# Patient Record
Sex: MALE | Race: ASIAN | NOT HISPANIC OR LATINO | Employment: FULL TIME | URBAN - METROPOLITAN AREA
[De-identification: names, ages, dates, MRNs, and addresses within clinical notes are randomized per-mention and may not be internally consistent; named-entity substitution may affect disease eponyms.]

---

## 2017-01-18 ENCOUNTER — OFFICE VISIT - HEALTHEAST (OUTPATIENT)
Dept: FAMILY MEDICINE | Facility: CLINIC | Age: 27
End: 2017-01-18

## 2017-01-18 DIAGNOSIS — B18.1 CHRONIC VIRAL HEPATITIS B WITHOUT DELTA AGENT AND WITHOUT COMA (H): ICD-10-CM

## 2017-01-18 ASSESSMENT — MIFFLIN-ST. JEOR: SCORE: 1593.28

## 2017-01-19 LAB — HBV DNA DETECT/QUANT, S: 205 IU/ML

## 2017-03-10 ENCOUNTER — OFFICE VISIT - HEALTHEAST (OUTPATIENT)
Dept: FAMILY MEDICINE | Facility: CLINIC | Age: 27
End: 2017-03-10

## 2017-03-10 DIAGNOSIS — M94.0 COSTOCHONDRITIS: ICD-10-CM

## 2017-03-10 ASSESSMENT — MIFFLIN-ST. JEOR: SCORE: 1609.7

## 2017-07-19 ENCOUNTER — OFFICE VISIT - HEALTHEAST (OUTPATIENT)
Dept: FAMILY MEDICINE | Facility: CLINIC | Age: 27
End: 2017-07-19

## 2017-07-19 DIAGNOSIS — B18.1 CHRONIC VIRAL HEPATITIS B WITHOUT DELTA AGENT AND WITHOUT COMA (H): ICD-10-CM

## 2017-07-19 LAB — AFP SERPL-MCNC: <2 UG/ML

## 2017-07-19 ASSESSMENT — MIFFLIN-ST. JEOR: SCORE: 1604.07

## 2017-07-24 LAB — HBV DNA DETECT/QUANT, S: 283 IU/ML

## 2018-01-18 ENCOUNTER — AMBULATORY - HEALTHEAST (OUTPATIENT)
Dept: FAMILY MEDICINE | Facility: CLINIC | Age: 28
End: 2018-01-18

## 2018-01-18 ENCOUNTER — AMBULATORY - HEALTHEAST (OUTPATIENT)
Dept: LAB | Facility: CLINIC | Age: 28
End: 2018-01-18

## 2018-01-18 DIAGNOSIS — Z11.1 SCREENING-PULMONARY TB: ICD-10-CM

## 2018-01-22 LAB
QTF INTERPRETATION: ABNORMAL
QTF MITOGEN - NIL: 8.71 IU/ML
QTF NIL: 2.53 IU/ML
QTF RESULT: POSITIVE
QTF TB ANTIGEN - NIL: 8.71 IU/ML

## 2018-01-23 ENCOUNTER — COMMUNICATION - HEALTHEAST (OUTPATIENT)
Dept: FAMILY MEDICINE | Facility: CLINIC | Age: 28
End: 2018-01-23

## 2021-01-26 ENCOUNTER — COMMUNICATION - HEALTHEAST (OUTPATIENT)
Dept: FAMILY MEDICINE | Facility: CLINIC | Age: 31
End: 2021-01-26

## 2021-01-28 ENCOUNTER — OFFICE VISIT - HEALTHEAST (OUTPATIENT)
Dept: FAMILY MEDICINE | Facility: CLINIC | Age: 31
End: 2021-01-28

## 2021-01-28 DIAGNOSIS — Z22.7 TB LUNG, LATENT: ICD-10-CM

## 2021-01-28 DIAGNOSIS — Z11.1 SCREENING EXAMINATION FOR PULMONARY TUBERCULOSIS: ICD-10-CM

## 2021-01-28 ASSESSMENT — MIFFLIN-ST. JEOR: SCORE: 1700.42

## 2021-05-30 VITALS — BODY MASS INDEX: 29.59 KG/M2 | WEIGHT: 167 LBS | HEIGHT: 63 IN

## 2021-05-30 VITALS — WEIGHT: 163.04 LBS | BODY MASS INDEX: 28.89 KG/M2 | HEIGHT: 63 IN

## 2021-05-31 VITALS — WEIGHT: 165 LBS | HEIGHT: 63 IN | BODY MASS INDEX: 29.23 KG/M2

## 2021-06-05 VITALS
OXYGEN SATURATION: 98 % | HEART RATE: 81 BPM | SYSTOLIC BLOOD PRESSURE: 118 MMHG | DIASTOLIC BLOOD PRESSURE: 70 MMHG | BODY MASS INDEX: 33.13 KG/M2 | TEMPERATURE: 98 F | RESPIRATION RATE: 16 BRPM | HEIGHT: 63 IN | WEIGHT: 187 LBS

## 2021-06-08 NOTE — PROGRESS NOTES
Assessment: /    Plan:    1. Chronic viral hepatitis B without delta agent and without coma  Hepatic Profile    Hepatitis B Virus (HBV) DNA, Detection and Quantification by RT-PCR       Recheck in 6 months.  Patient was seen with Oneyda fuenteser, Gretel Adams.      Subjective:    HPI:  Albert Adams is a 26-year-old male presenting for follow-up on hepatitis B.  Previous level was 217.        Review of Systems:  No fever, cough, vomiting, abdominal pain.      No current outpatient prescriptions on file.     No current facility-administered medications for this visit.          Objective:    Vitals:    01/18/17 0911   BP: 110/70   Pulse: 76   Resp: 18   Temp: 98.3  F (36.8  C)   SpO2: 98%       Gen:  NAD, VSS  Lungs:  normal  Heart:  normal  Abdomen:  No HSM, mass or tenderness        ADDITIONAL HISTORY SUMMARIZED (2): None.  DECISION TO OBTAIN EXTRA INFORMATION (1): None.   RADIOLOGY TESTS (1): None.  LABS (1): Ordered.  MEDICINE TESTS (1): None.  INDEPENDENT REVIEW (2 each): None.     Total Data Points: 1

## 2021-06-12 NOTE — PROGRESS NOTES
Assessment: /    Plan:    1. Chronic viral hepatitis B without delta agent and without coma  Hepatic Profile    Hepatitis B Virus (HBV) DNA, Detection and Quantification by RT-PCR ($$$)    AFP-Tumor Marker       Recheck in 6 months.  Patient was seen with Oneyda , Marianela Stephens.      Subjective:    HPI:  Albert Adams is a 26-year-old male presenting for follow-up on hepatitis B.  Viral level was 205 in January.    Social Hx: He does not use alcohol.    Review of Systems: No fever, vomiting, melena, diarrhea.      No current outpatient prescriptions on file.     No current facility-administered medications for this visit.          Objective:    Vitals:    07/19/17 0907   BP: 102/60   Pulse: 67   Resp: 19   Temp: 97.8  F (36.6  C)   SpO2: 98%       Gen:  NAD, VSS  Lungs:  normal  Heart:  normal  Abdomen:  No HSM, mass or tenderness        ADDITIONAL HISTORY SUMMARIZED (2): None.  DECISION TO OBTAIN EXTRA INFORMATION (1): None.   RADIOLOGY TESTS (1): None.  LABS (1): Ordered.  MEDICINE TESTS (1): None.  INDEPENDENT REVIEW (2 each): None.     Total Data Points: 1

## 2021-06-14 NOTE — TELEPHONE ENCOUNTER
New Appointment Needed  What is the reason for the visit:    Mantoux Placement  Appt Request  What is the purpose of the mantoux?:  Work: work  Is there a form to be completed?:   No  How soon do you need the mantoux placed?:  date: soon    Provider Preference: Any available  How soon do you need to be seen?: as soon as possible  Waitlist offered?: No  Okay to leave a detailed message:  Yes

## 2021-06-14 NOTE — PROGRESS NOTES
"ASSESMENT AND PLAN:  Diagnoses and all orders for this visit:    Screening examination for pulmonary tuberculosis  History of positive QuantiFERON gold test in 2018.  No active TB symptoms.  No indications to retest today.  Letter provided for employer, see document in chart.    TB lung, latent  Completed treatment in  per patient.      SUBJECTIVE: Albert Adams is a 30-year-old male here for screening yearly TB screening test for work.  He works as a PCA.  He had positive TB Gold QuantiFERON test in 2018.  States he was treated for latent TB in  when he first came to the [place].  No known exposure to active TB.  No chronic cough, night sweats or weight loss.    Past Medical History:   Diagnosis Date     Chronic hepatitis B (H)      LTBI (latent tuberculosis infection) 2016    treated by [facility], ending in 2016     Patient Active Problem List   Diagnosis     Chronic viral hepatitis B without delta agent and without coma (H)       Allergies:  No Known Allergies    Social History     Tobacco Use   Smoking Status Former Smoker     Types: Cigarettes     Quit date: 2013     Years since quittin.2   Smokeless Tobacco Never Used       Review of systems otherwise negative except as listed in HPI.   Social History     Tobacco Use   Smoking Status Former Smoker     Types: Cigarettes     Quit date: 2013     Years since quittin.2   Smokeless Tobacco Never Used       OBJECTICE: /70 (Patient Site: Left Arm, Patient Position: Sitting, Cuff Size: Adult Regular)   Pulse 81   Temp 98  F (36.7  C) (Oral)   Resp 16   Ht 5' 2.5\" (1.588 m)   Wt 187 lb (84.8 kg)   SpO2 98%   BMI 33.66 kg/m      DATA REVIEWED:    Labs Reviewed or Ordered (1):       GEN-alert,  in no apparent distress.  HEENT-neck is supple.  CV-regular rate and rhythm with no murmur.   RESP-lungs clear to auscultation .a.  SKIN-normal    This transcription uses voice recognition software, which may contain typographical " errors.        Jean-Claude Mcarthur   1/28/2021

## 2021-06-15 PROBLEM — B18.1 CHRONIC VIRAL HEPATITIS B WITHOUT DELTA AGENT AND WITHOUT COMA (H): Status: ACTIVE | Noted: 2017-01-18

## 2021-06-21 NOTE — LETTER
Letter by Jean-Claude Mcarthur MD at      Author: Jean-Claude Mcarthur MD Service: -- Author Type: --    Filed:  Encounter Date: 1/28/2021 Status: (Other)         January 28, 2021     Patient: Albert Adams   YOB: 1990   Date of Visit: 1/28/2021       To Whom It May Concern:    Above patient had positive QuantiFERON gold test in 2018.  He completed latent TB treatment in 2015.  He has a negative TB review of systems today.  There is no indication to repeat TB QuantiFERON gold test.  Cleared to work as a PCA    If you have any questions or concerns, please don't hesitate to call.    Sincerely,        Electronically signed by Jean-Claude Mcarthur MD

## 2022-03-04 ENCOUNTER — OFFICE VISIT (OUTPATIENT)
Dept: FAMILY MEDICINE | Facility: CLINIC | Age: 32
End: 2022-03-04
Payer: COMMERCIAL

## 2022-03-04 VITALS
SYSTOLIC BLOOD PRESSURE: 136 MMHG | OXYGEN SATURATION: 97 % | BODY MASS INDEX: 34.55 KG/M2 | TEMPERATURE: 98.1 F | HEIGHT: 63 IN | DIASTOLIC BLOOD PRESSURE: 86 MMHG | HEART RATE: 97 BPM | WEIGHT: 195 LBS

## 2022-03-04 DIAGNOSIS — Z23 NEED FOR VACCINATION: ICD-10-CM

## 2022-03-04 DIAGNOSIS — R03.0 BORDERLINE HIGH BLOOD PRESSURE: ICD-10-CM

## 2022-03-04 DIAGNOSIS — B18.1 CHRONIC VIRAL HEPATITIS B WITHOUT DELTA AGENT AND WITHOUT COMA (H): ICD-10-CM

## 2022-03-04 DIAGNOSIS — R21 RASH: Primary | ICD-10-CM

## 2022-03-04 DIAGNOSIS — Z11.4 SCREENING FOR HIV (HUMAN IMMUNODEFICIENCY VIRUS): ICD-10-CM

## 2022-03-04 DIAGNOSIS — Z11.59 NEED FOR HEPATITIS C SCREENING TEST: ICD-10-CM

## 2022-03-04 LAB
ALBUMIN SERPL-MCNC: 4.5 G/DL (ref 3.5–5)
ALP SERPL-CCNC: 76 U/L (ref 45–120)
ALT SERPL W P-5'-P-CCNC: 185 U/L (ref 0–45)
AST SERPL W P-5'-P-CCNC: 107 U/L (ref 0–40)
BILIRUB DIRECT SERPL-MCNC: 0.1 MG/DL
BILIRUB SERPL-MCNC: 0.4 MG/DL (ref 0–1)
ERYTHROCYTE [DISTWIDTH] IN BLOOD BY AUTOMATED COUNT: 13 % (ref 10–15)
HCT VFR BLD AUTO: 46.5 % (ref 40–53)
HGB BLD-MCNC: 15.9 G/DL (ref 13.3–17.7)
HIV 1+2 AB+HIV1 P24 AG SERPL QL IA: NEGATIVE
MCH RBC QN AUTO: 28.8 PG (ref 26.5–33)
MCHC RBC AUTO-ENTMCNC: 34.2 G/DL (ref 31.5–36.5)
MCV RBC AUTO: 84 FL (ref 78–100)
PLATELET # BLD AUTO: 489 10E3/UL (ref 150–450)
PROT SERPL-MCNC: 8.2 G/DL (ref 6–8)
RBC # BLD AUTO: 5.53 10E6/UL (ref 4.4–5.9)
WBC # BLD AUTO: 9.7 10E3/UL (ref 4–11)

## 2022-03-04 PROCEDURE — 99000 SPECIMEN HANDLING OFFICE-LAB: CPT | Performed by: FAMILY MEDICINE

## 2022-03-04 PROCEDURE — 80076 HEPATIC FUNCTION PANEL: CPT | Performed by: FAMILY MEDICINE

## 2022-03-04 PROCEDURE — 87517 HEPATITIS B DNA QUANT: CPT | Performed by: FAMILY MEDICINE

## 2022-03-04 PROCEDURE — 86618 LYME DISEASE ANTIBODY: CPT | Performed by: FAMILY MEDICINE

## 2022-03-04 PROCEDURE — 86780 TREPONEMA PALLIDUM: CPT | Performed by: FAMILY MEDICINE

## 2022-03-04 PROCEDURE — 86803 HEPATITIS C AB TEST: CPT | Performed by: FAMILY MEDICINE

## 2022-03-04 PROCEDURE — 87389 HIV-1 AG W/HIV-1&-2 AB AG IA: CPT | Performed by: FAMILY MEDICINE

## 2022-03-04 PROCEDURE — 87350 HEPATITIS BE AG IA: CPT | Mod: 90 | Performed by: FAMILY MEDICINE

## 2022-03-04 PROCEDURE — 36415 COLL VENOUS BLD VENIPUNCTURE: CPT | Performed by: FAMILY MEDICINE

## 2022-03-04 PROCEDURE — 99214 OFFICE O/P EST MOD 30 MIN: CPT | Performed by: FAMILY MEDICINE

## 2022-03-04 PROCEDURE — 85027 COMPLETE CBC AUTOMATED: CPT | Performed by: FAMILY MEDICINE

## 2022-03-04 RX ORDER — BENZOCAINE/MENTHOL 6 MG-10 MG
LOZENGE MUCOUS MEMBRANE 2 TIMES DAILY
Qty: 60 G | Refills: 1 | Status: SHIPPED | OUTPATIENT
Start: 2022-03-04 | End: 2022-09-05

## 2022-03-04 RX ORDER — LORATADINE 10 MG/1
10 TABLET ORAL DAILY
Qty: 30 TABLET | Refills: 3 | Status: SHIPPED | OUTPATIENT
Start: 2022-03-04 | End: 2022-09-05

## 2022-03-04 NOTE — PROGRESS NOTES
ASSESSMENT/PLAN:    Albert was seen today for derm problem.    Diagnoses and all orders for this visit:    Rash  Etiology of his rash is uncertain.  It since it started with tick bites, I did check for Lyme.  Also checking for syphilis given diffuse rash.  Will treat empirically with loratadine and hydrocortisone, but it also like him to see dermatology to more definitively diagnose and treat this rash.  -     Treponema Abs w Reflex to RPR and Titer; Future  -     Lyme Disease Sherly with reflex to WB Serum; Future  -     hydrocortisone (CORTAID) 1 % external cream; Apply topically 2 times daily  -     loratadine (CLARITIN) 10 MG tablet; Take 1 tablet (10 mg) by mouth daily  -     Adult Dermatology Referral; Future    Chronic viral hepatitis B without delta agent and without coma (H)  [facility] but has been lost to follow-up in now has not been seen for a couple years, it appears.  I got an updated labs today and will refer him to gastroenterology.  -     Hepatic function panel; Future  -     Hep B Virus DNA Quant Real Time PCR; Future  -     CBC with platelets; Future  -     INR; Future  -     Hepatitis Be antigen; Future  -     Adult Gastro Ref - Consult Only; Future    Screening for HIV (human immunodeficiency virus)  -     HIV Antigen Antibody Combo; Future    Need for hepatitis C screening test  -     Hepatitis C Screen Reflex to HCV RNA Quant and Genotype; Future    Borderline high blood pressure  Blood pressure was borderline today at 136/86.  Had last been checked here a year ago and was better 118/70.  He does note that he has gained weight since that time and wonders if that could be contributing.  He will try to initiate healthy lifestyle and will follow-up at a physical in a couple of months.    Need for vaccination  Flu shot, Covid vaccine, and Pneumovax were recommended today, but he declined.  Will reconsider at his physical.    Other orders  -     REVIEW OF HEALTH  "MAINTENANCE PROTOCOL ORDERS          Return in about 2 months (around 5/4/2022) for Routine preventive, with your regular doctor.          SUBJECTIVE:  Albert Adams is a 31 year old male here for Derm Problem (itchy)    It sounds like he got a bunch of ticks in September 2021 while he was out hunting.  Some more attached and summer on his close.  Ever since that has had trouble with a rash.  It is on his arms and legs abdomen and back.  It is itchy somewhat.  The spots are small, but they worsen if he scratches.  They also seem like they might be worse if he eats fish paste.  They got worse since when they first started.  He lives with several other family members and no one else has a rash except for him.  Has not tried anything particularly for them yet.        OBJECTIVE:  :  /86 (BP Location: Right arm, Patient Position: Sitting, Cuff Size: Adult Regular)   Pulse 97   Temp 98.1  F (36.7  C) (Oral)   Ht 1.588 m (5' 2.5\")   Wt 88.5 kg (195 lb)   SpO2 97%   BMI 35.10 kg/m    Wt Readings from Last 3 Encounters:   03/04/22 88.5 kg (195 lb)   01/28/21 84.8 kg (187 lb)   07/19/17 74.8 kg (165 lb)         Gen:  A&A, NAD  Skin: Diffuse erythematous rash with some examples below.  He has just that one large area of rashes eschar on the left shin, but otherwise variety of erythematous nonpustular papules on arms legs and abdomen, less so on the back.          Results for orders placed or performed in visit on 03/04/22   CBC with platelets   Result Value Ref Range    WBC Count 9.7 4.0 - 11.0 10e3/uL    RBC Count 5.53 4.40 - 5.90 10e6/uL    Hemoglobin 15.9 13.3 - 17.7 g/dL    Hematocrit 46.5 40.0 - 53.0 %    MCV 84 78 - 100 fL    MCH 28.8 26.5 - 33.0 pg    MCHC 34.2 31.5 - 36.5 g/dL    RDW 13.0 10.0 - 15.0 %    Platelet Count 489 (H) 150 - 450 10e3/uL      "

## 2022-03-05 LAB
HBV E AG SERPL QL IA: NEGATIVE
T PALLIDUM AB SER QL: NONREACTIVE

## 2022-03-06 LAB — HCV AB SERPL QL IA: NONREACTIVE

## 2022-03-07 LAB
B BURGDOR IGG+IGM SER QL: 0.07
HBV DNA SERPL NAA+PROBE-ACNC: <20 IU/ML
HBV DNA SERPL NAA+PROBE-LOG IU: <1.3 {LOG_IU}/ML

## 2022-03-31 ENCOUNTER — TELEPHONE (OUTPATIENT)
Dept: FAMILY MEDICINE | Facility: CLINIC | Age: 32
End: 2022-03-31
Payer: COMMERCIAL

## 2022-03-31 NOTE — TELEPHONE ENCOUNTER
I referred this patient to gastroenterology and dermatology when I saw him 3/4/22, but no appointments have been made.  Can specialty scheduling please assist him?

## 2022-05-04 ENCOUNTER — OFFICE VISIT (OUTPATIENT)
Dept: FAMILY MEDICINE | Facility: CLINIC | Age: 32
End: 2022-05-04
Payer: COMMERCIAL

## 2022-05-04 VITALS
TEMPERATURE: 98.1 F | RESPIRATION RATE: 16 BRPM | BODY MASS INDEX: 34.64 KG/M2 | HEART RATE: 91 BPM | SYSTOLIC BLOOD PRESSURE: 126 MMHG | WEIGHT: 195.5 LBS | OXYGEN SATURATION: 96 % | DIASTOLIC BLOOD PRESSURE: 80 MMHG | HEIGHT: 63 IN

## 2022-05-04 DIAGNOSIS — R21 RASH: ICD-10-CM

## 2022-05-04 DIAGNOSIS — Z00.00 ROUTINE GENERAL MEDICAL EXAMINATION AT A HEALTH CARE FACILITY: Primary | ICD-10-CM

## 2022-05-04 PROCEDURE — 99395 PREV VISIT EST AGE 18-39: CPT | Performed by: FAMILY MEDICINE

## 2022-05-04 RX ORDER — HYDROCORTISONE 2.5 %
CREAM (GRAM) TOPICAL
Qty: 30 G | Refills: 3 | Status: SHIPPED | OUTPATIENT
Start: 2022-05-04 | End: 2022-09-05

## 2022-05-04 NOTE — PROGRESS NOTES
SUBJECTIVE:   CC: Albert ASAF Adams is an 31 year old male who presents for preventative health visit.       Patient has been advised of split billing requirements and indicates understanding: Yes  Healthy Habits:     Getting at least 3 servings of Calcium per day:  Yes    Bi-annual eye exam:  NO    Dental care twice a year:  NO    Sleep apnea or symptoms of sleep apnea:  None    Diet:  Regular (no restrictions)    Frequency of exercise:  2-3 days/week    Duration of exercise:  Greater than 60 minutes    Taking medications regularly:  Yes    Medication side effects:  Not applicable    PHQ-2 Total Score: 0    Additional concerns today:  No      Rash increases if he eats meat or fish paste.    Not working as an [employer]  for now.     and .  Hep B <20    He does not use alcohol        Today's PHQ-2 Score:   PHQ-2 (  [employer]) 2022   Q1: Little interest or pleasure in doing things 0   Q2: Feeling down, depressed or hopeless 0   PHQ-2 Score 0   Q1: Little interest or pleasure in doing things Not at all   Q2: Feeling down, depressed or hopeless Not at all   PHQ-2 Score 0       Abuse: Current or Past(Physical, Sexual or Emotional)- No  Do you feel safe in your environment? Yes    Have you ever done Advance Care Planning? (For example, a Health Directive, POLST, or a discussion with a medical provider or your loved ones about your wishes): No, advance care planning information given to patient to review.  Patient plans to discuss their wishes with loved ones or provider.      Social History     Tobacco Use     Smoking status: Former Smoker     Types: Cigarettes     Quit date: 2013     Years since quittin.4     Smokeless tobacco: Never Used   Substance Use Topics     Alcohol use: No     If you drink alcohol do you typically have >3 drinks per day or >7 drinks per week? Not applicable    Alcohol Use 2022   Prescreen: >3 drinks/day or >7 drinks/week? Not Applicable   No flowsheet data  "found.    Last PSA: No results found for: PSA    Reviewed orders with patient. Reviewed health maintenance and updated orders accordingly - Yes      Reviewed and updated as needed this visit by clinical staff   Tobacco  Allergies  Meds                Reviewed and updated as needed this visit by Provider                   Current Outpatient Medications   Medication Sig Dispense Refill     hydrocortisone 2.5 % cream 1/2 gram to arms and legs 2 times daily 30 g 3     hydrocortisone (CORTAID) 1 % external cream Apply topically 2 times daily (Patient not taking: Reported on 5/4/2022) 60 g 1     loratadine (CLARITIN) 10 MG tablet Take 1 tablet (10 mg) by mouth daily (Patient not taking: Reported on 5/4/2022) 30 tablet 3         Review of Systems  CONSTITUTIONAL: NEGATIVE for fever, chills, change in weight  INTEGUMENTARY/SKIN: NEGATIVE for worrisome rashes, moles or lesions  EYES: NEGATIVE for vision changes or irritation  ENT: NEGATIVE for ear, mouth and throat problems  RESP: NEGATIVE for significant cough or SOB  CV: NEGATIVE for chest pain, palpitations or peripheral edema  GI: NEGATIVE for nausea, abdominal pain, heartburn, or change in bowel habits   male: negative for dysuria, hematuria, decreased urinary stream, erectile dysfunction, urethral discharge  MUSCULOSKELETAL: NEGATIVE for significant arthralgias or myalgia  NEURO: NEGATIVE for weakness, dizziness or paresthesias  PSYCHIATRIC: NEGATIVE for changes in mood or affect    OBJECTIVE:   /80   Pulse 91   Temp 98.1  F (36.7  C) (Oral)   Resp 16   Ht 1.588 m (5' 2.5\")   Wt 88.7 kg (195 lb 8 oz)   SpO2 96%   BMI 35.19 kg/m      Physical Exam  Eyes: EOM full, pupils normal, conjunctivae normal  Ears: TM's and canals normal  Oropharynx: normal  Neck: supple without adenopathy or thyromegaly  Lungs: normal  Heart: regular rhythm, normal rate, no murmur  Abdomen: no HSM, mass or tenderness  Pt declined   Extremities: FROM, normal strength and " "sensation  Skin: 1\" area of scab and erythema anterior left tibia.  Papules on arms          ASSESSMENT/PLAN:   Albert was seen today for physical.    Diagnoses and all orders for this visit:    Routine general medical examination at a health care facility    Rash  -     hydrocortisone 2.5 % cream; 1/2 gram to arms and legs 2 times daily        Patient has been advised of split billing requirements and indicates understanding: Yes    COUNSELING:   Reviewed preventive health counseling, as reflected in patient instructions       Regular exercise       Healthy diet/nutrition    Estimated body mass index is 35.19 kg/m  as calculated from the following:    Height as of this encounter: 1.588 m (5' 2.5\").    Weight as of this encounter: 88.7 kg (195 lb 8 oz).         He reports that he quit smoking about 8 years ago. His smoking use included cigarettes. He has never used smokeless tobacco.      Counseling Resources:  ATP IV Guidelines  Pooled Cohorts Equation Calculator  FRAX Risk Assessment  ICSI Preventive Guidelines  Dietary Guidelines for Americans, 2010  USDA's MyPlate  ASA Prophylaxis  Lung CA Screening    Atilio Henry MD, MD  [facility]  "

## 2022-05-19 NOTE — TELEPHONE ENCOUNTER
RECORDS RECEIVED FROM: Internal   Appt Date: 06.09.2022   NOTES STATUS DETAILS   OFFICE NOTE from referring provider Internal 03.04.2022 Irma Petersen MD   OFFICE NOTES from other specialists Care Everywhere 02.18.2019 Marva Elder, APRN, CNP      10.04.2017 Avtar Cowan MD    DISCHARGE SUMMARY from hospital     MEDICATION LIST Internal    LIVER BIOSPY (IF APPLICABLE)      PATHOLOGY REPORTS  Care Everywhere 03.01.2019 US BIOPSY LIVER     IMAGING     ENDOSCOPY (IF AVAILABLE)     COLONOSCOPY (IF AVAILABLE)     ULTRASOUND LIVER Care Everywhere 10.11.2017 US ABD COMPLETE   CT OF ABDOMEN     MRI OF LIVER     FIBROSCAN, US ELASTOGRAPHY, FIBROSIS SCAN, MR ELASTOGRAPHY Care Everywhere 01.11.2019 US Elastography W Complete AB US     LABS     HEPATIC PANEL (LIVER PANEL) Internal 03.04.2022   BASIC METABOLIC PANEL Care Everywhere 02.18.2019   COMPLETE METABOLIC PANEL Internal 03.04.2022   COMPLETE BLOOD COUNT (CBC) Internal 03.04.2022   INTERNATIONAL NORMALIZED RATIO (INR) Care Everywhere 03.01.2019   HEPATITIS C ANTIBODY     HEPATITIS C VIRAL LOAD/PCR     HEPATITIS C GENOTYPE     HEPATITIS B SURFACE ANTIGEN     HEPATITIS B SURFACE ANTIBODY     HEPATITIS B DNA QUANT LEVEL     HEPATITIS B CORE ANTIBODY       Action 05.19.2022 RM   Action Taken Pending for images      Action 05.25.2022 RM   Action Taken Called [facility] for images called [phone] spoke to a rep who will be pushing image over pending.     Action 05.25.2022 RM   Action Taken Images received and uploaded to chart.

## 2022-06-09 ENCOUNTER — PRE VISIT (OUTPATIENT)
Dept: GASTROENTEROLOGY | Facility: CLINIC | Age: 32
End: 2022-06-09
Payer: COMMERCIAL

## 2022-06-09 ENCOUNTER — OFFICE VISIT (OUTPATIENT)
Dept: GASTROENTEROLOGY | Facility: CLINIC | Age: 32
End: 2022-06-09
Attending: FAMILY MEDICINE
Payer: COMMERCIAL

## 2022-06-09 VITALS
WEIGHT: 190.6 LBS | BODY MASS INDEX: 34.31 KG/M2 | SYSTOLIC BLOOD PRESSURE: 116 MMHG | TEMPERATURE: 98 F | DIASTOLIC BLOOD PRESSURE: 79 MMHG | OXYGEN SATURATION: 100 % | HEART RATE: 66 BPM

## 2022-06-09 DIAGNOSIS — E66.09 CLASS 1 OBESITY DUE TO EXCESS CALORIES WITH SERIOUS COMORBIDITY AND BODY MASS INDEX (BMI) OF 34.0 TO 34.9 IN ADULT: ICD-10-CM

## 2022-06-09 DIAGNOSIS — K75.81 NASH (NONALCOHOLIC STEATOHEPATITIS): Primary | ICD-10-CM

## 2022-06-09 DIAGNOSIS — B18.1 CHRONIC VIRAL HEPATITIS B WITHOUT DELTA AGENT AND WITHOUT COMA (H): ICD-10-CM

## 2022-06-09 DIAGNOSIS — E66.811 CLASS 1 OBESITY DUE TO EXCESS CALORIES WITH SERIOUS COMORBIDITY AND BODY MASS INDEX (BMI) OF 34.0 TO 34.9 IN ADULT: ICD-10-CM

## 2022-06-09 PROCEDURE — 99204 OFFICE O/P NEW MOD 45 MIN: CPT | Performed by: INTERNAL MEDICINE

## 2022-06-09 PROCEDURE — G0463 HOSPITAL OUTPT CLINIC VISIT: HCPCS

## 2022-06-09 ASSESSMENT — PAIN SCALES - GENERAL: PAINLEVEL: NO PAIN (0)

## 2022-06-09 NOTE — NURSING NOTE
Chief Complaint   Patient presents with     New Patient       /79   Pulse 66   Temp 98  F (36.7  C) (Oral)   Wt 86.5 kg (190 lb 9.6 oz)   SpO2 100%   BMI 34.31 kg/m      Chuy Cabrales on 6/9/2022 at 8:07 AM

## 2022-06-09 NOTE — PROGRESS NOTES
Date of Service: 6/9/2022     Referring Provider: Atilio Henry    Subjective:            Albert Adams is a 31 year old male presenting for evaluation of abnormal liver tests    Due to language barrier, an  was present during the history-taking and subsequent discussion (and for part of the physical exam) with this patient.      History of Present Illness   Albert Adams is a 31 year old male with past medical history of e-antigen negative chronic inactive hepatitis B and biopsy proven DESHPANDE with mild hepatic fibrosis who presents in consultation.    He moved [place] from [facility] approximately 7 years ago.  Ultimately established care with gastroenterology at [facility] in 2017 for abnormal liver tests and serologies consistent with chronic hepatitis B.  Noted that his hepatitis B was relatively inactive and an ultrasound at that time did demonstrate significant hepatic steatosis.  He was seen every 6 months or so and lifestyle recommendations were made.  Ultimately underwent a liver biopsy in March 2019 which demonstrated approximately 80% fat with active steatohepatitis and the trichrome stain demonstrated approximately stage I fibrosis.  Notes that he did not maintain care and changed health systems during the pandemic and is coming in to reestablish care.    Denies knowledge of any family history of liver disease, and is unsure if any family members have been tested for hepatitis B.  He reports that he is not currently working, with his last job being a  for [employer].  He denies any significant alcohol use.    Notes that he is put on weight over the last several months secondary to his dietary habits and relatively sedentary lifestyle.    Past Medical History:  Past Medical History:   Diagnosis Date     Chronic hepatitis B (H)      LTBI (latent tuberculosis infection) 2016    treated by [facility], ending in 2016   - DESHPANDE    Surgical History:  No past surgical history on  file.    Social History:  Social History     Tobacco Use     Smoking status: Former Smoker     Types: Cigarettes     Quit date: 2013     Years since quittin.5     Smokeless tobacco: Never Used   Substance Use Topics     Alcohol use: No     Drug use: No        Family History:  No known history of liver disease    Medications:  Current Outpatient Medications   Medication     hydrocortisone (CORTAID) 1 % external cream     hydrocortisone 2.5 % cream     loratadine (CLARITIN) 10 MG tablet     No current facility-administered medications for this visit.       Review of Systems  A complete 10 point review of systems was asked and answered in the negative unless specifically commented upon in the HPI    Objective:         Vitals:    22 0805   BP: 116/79   Pulse: 66   Temp: 98  F (36.7  C)   TempSrc: Oral   SpO2: 100%   Weight: 86.5 kg (190 lb 9.6 oz)     Body mass index is 34.31 kg/m .     Physical Exam    Vitals reviewed.   Constitutional: Well-developed, well-nourished, in no apparent distress.    HEENT: Normocephalic. no scleral icterus.  Neck/Lymph: Normal ROM  Cardiac:  Regular rate  Respiratory: Normal respiratory excursion   GI:  Abdomen soft, mild obesity  Skin:  Skin is warm and dry. No rash noted.  no jaundice.   Peripheral Vascular: no lower extremity edema. 2+ pulses in all extremities  Musculoskeletal:  ROM intact, normal muscle bulk    Psychiatric: Normal mood and affect. Behavior is normal.  Neuro:  no asterixis, no tremor    Labs and Diagnostic tests:  Lab Results   Component Value Date    BILITOTAL 0.4 2022     2022     2022    ALKPHOS 76 2022     Lab Results   Component Value Date    ALBUMIN 4.5 2022    PROTTOTAL 8.2 2022        Procedures:  Liver biopsy: 3/1/2019  Liver, random, needle core biopsy:       -Clinical history of hepatitis B       -Marked steatosis with features of steatohepatitis       -Minimal patchy (grade 0-1) portal  activity       -See microscopic description   -The biopsy is of good quality.     There is marked macrovesicular steatosis involving 80% of the hepatic   parenchyma with occasional ballooned hepatocytes and mild lobular   inflammation.  Portal triads show only focal mild inflammation   consisting of lymphocytes.  There is no bile duct damage or   periductular granulomas.  There is no cholestasis.  A reticulin stain   shows an intact reticulin framework.  The trichrome stain does not   show significant portal fibrosis, but does show perivenular fibrosis.     The iron stain does not show significant iron deposition      Assessment and Plan:    Abnormal Liver Tests:    -Based on available information he likely has abnormal liver test secondary to nonalcoholic steatohepatitis  -He has chronic hepatitis B, but the viral load is below the detectable limits, which suggests against hepatitis B as the overt etiology of hepatic inflammation  -He does have a liver biopsy from March 2019 which did demonstrate 80% steatosis with significant steatohepatitis  -We will plan for full lab evaluation including labs to check status of hepatitis B as well as total cholesterol levels and hepatitis delta (given low hepatitis B viral load with elevated liver tests    Non-Alcoholic Fatty Liver Disease  - I had a long discussion with the patient about nonalcoholic fatty liver disease (NAFLD).  We discussed how fat deposits in the liver, how this leads to inflammation, and how chronic inflammation (DESHPANDE) can ultimately lead to scarring and cirrhosis.  The long-term complications of fatty liver disease were discussed with the patient, including the increased risk of cardiovascular disease complications,the risk of developing diabetes (if not already), and variable progression to cirrhosis and end stage liver disease.  - He has had a liver biopsy demonstrating overt DESHPANDE    Management of NAFLD/DESHPANDE  - We spent time discussing an appropriate  "diet, exercise and weight loss plan.      - Recommend exercise regularly: 4+ times per week, with an average of about 45 minutes per day.      - It has shown that patients who exercise regularly can have improvement of insulin resistance and resolution of fatty liver disease, even if they are not able to lose weight.     - Recommend a low-carbohydrate, low-calorie diet (3540-3398 calories per day).    - An ideal weight loss plan would be to lose 7-10% of body weight over the next six months  - Recommend aggressive diabetes management: ideal goal Hgb A1c goal of =/< 7%. If possible addition of insulin sensitizing agents like metformin or liraglutide will be helpful.    - Management of cholesterol is also very important.    - The use of \"statins\" (HMG-CoA reductase medications) are an effective means of therapy and are not contra-indicated in those with abnormal liver tests OR those with cirrhosis.  The value of these medications in this population far outweigh the minor risks of abnormal liver tests.   - Goal LDL in those with DESHPANDE are < 100 mg/dL  - Consider the utility of liberalizing coffee consumption as some data that this may slow progression and reverse effects of DESHPANDE-related fibrosis.  - We plan to order liver tests to be checked every 6 months to assess for dynamic changes, as a potential marker of another cause of chronic liver disease.    E-Ag Negative, Chronic Inactive hepatitis B  -Treating positive viral load but below the detectable limits  -We will plan to check levels again in 6 months  -We will also check hepatitis delta given hepatic inflammation and will hepatitis B viral load  -We will obtain an abdominal ultrasound in 6 months    Routine Health Care in Patient with Chronic Liver Disease:  - We recommend screening for hepatitis A and B, please vaccinate if not immune  - All patients with liver disease, particularly those with cholestatic liver disease, are at an increased risk for osteoporosis.  " We strongly recommend screening for Vitamin D deficiency at least twice yearly with aggressive supplementation/replacement as indicated.    - We also recommend a screening DEXA scan to evaluate for osteoporosis.  If present, should treat with calcium, Vitamin D supplementation, and recommend consideration of bisphosphonate therapy.  Also recommend follow up DEXA scans to evaluate for improvement of bone density on therapy.  - All patients with liver disease should avoid the use of Non-steroidal Anti-Inflammatory (NSAID) medications as they can cause significant injury to the kidneys in this population    Follow Up:  6 months      Thank you very much for the opportunity to participate in the care of this patient.  If you have any further questions, please don't hesitate to contact our office.    Thomas M. Leventhal, M.D.   of Medicine  Advanced & Transplant Hepatology  [facility]

## 2022-06-09 NOTE — PATIENT INSTRUCTIONS
"- Plan for Labs, ultrasound, and a clinic visit in 6 months    Non-Alcoholic Fatty Liver Disease  - I had a long discussion with the patient about nonalcoholic fatty liver disease (NAFLD).  We discussed how fat deposits in the liver, how this leads to inflammation, and how chronic inflammation (DESHPANDE) can ultimately lead to scarring and cirrhosis.  The long-term complications of fatty liver disease were discussed with the patient, including the increased risk of cardiovascular disease complications,the risk of developing diabetes (if not already), and variable progression to cirrhosis and end stage liver disease.    Management of NAFLD/DESHPANDE  - We spent time discussing an appropriate diet, exercise and weight loss plan.      - Recommend exercise regularly: 4+ times per week, with an average of about 40 minutes per day.      - It has shown that patients who exercise regularly can have improvement of insulin resistance and resolution of fatty liver disease, even if they are not able to lose weight.     - Recommend a low-carbohydrate, low-calorie diet (3620-9412 calories per day).    - An ideal weight loss plan would be to lose 7-10% of body weight over the next six months  - Management of cholesterol is also very important.    - The use of \"statins\" (HMG-CoA reductase medications) are an effective means of therapy and are not contra-indicated in those with abnormal liver tests OR those with cirrhosis.  The value of these medications in this population far outweigh the minor risks of abnormal liver tests.   - Goal LDL in those with DESHPANDE are < 100 mg/dL  - Consider the utility of liberalizing coffee consumption as some data that this may slow progression and reverse effects of DESHPANDE-related fibrosis.    "

## 2022-06-09 NOTE — LETTER
6/9/2022         RE: Albert Adams  [address] [place] [address]        Dear Colleague,    Thank you for referring your patient, Albert Adams, to the [facility] [place]. Please see a copy of my visit note below.    Date of Service: 6/9/2022     Referring Provider: Atilio Henry    Subjective:            Albert Adams is a 31 year old male presenting for evaluation of abnormal liver tests    Due to language barrier, an  was present during the history-taking and subsequent discussion (and for part of the physical exam) with this patient.      History of Present Illness   Albert Adams is a 31 year old male with past medical history of e-antigen negative chronic inactive hepatitis B and biopsy proven DESHPANDE with mild hepatic fibrosis who presents in consultation.    He [place] from [facility] approximately 7 years ago.  Ultimately established care with gastroenterology at [facility] in 2017 for abnormal liver tests and serologies consistent with chronic hepatitis B.  Noted that his hepatitis B was relatively inactive and an ultrasound at that time did demonstrate significant hepatic steatosis.  He was seen every 6 months or so and lifestyle recommendations were made.  Ultimately underwent a liver biopsy in March 2019 which demonstrated approximately 80% fat with active steatohepatitis and the trichrome stain demonstrated approximately stage I fibrosis.  Notes that he did not maintain care and changed health systems during the pandemic and is coming in to reestablish care.    Denies knowledge of any family history of liver disease, and is unsure if any family members have been tested for hepatitis B.  He reports that he is not currently working, with his last job being a  for [employer].  He denies any significant alcohol use.    Notes that he is put on weight over the last several months secondary to his dietary habits and relatively sedentary  lifestyle.    Past Medical History:  Past Medical History:   Diagnosis Date     Chronic hepatitis B (H)      LTBI (latent tuberculosis infection) 2016    treated by [facility], ending in 2016   - DESHPANDE    Surgical History:  No past surgical history on file.    Social History:  Social History     Tobacco Use     Smoking status: Former Smoker     Types: Cigarettes     Quit date: 2013     Years since quittin.5     Smokeless tobacco: Never Used   Substance Use Topics     Alcohol use: No     Drug use: No        Family History:  No known history of liver disease    Medications:  Current Outpatient Medications   Medication     hydrocortisone (CORTAID) 1 % external cream     hydrocortisone 2.5 % cream     loratadine (CLARITIN) 10 MG tablet     No current facility-administered medications for this visit.       Review of Systems  A complete 10 point review of systems was asked and answered in the negative unless specifically commented upon in the HPI    Objective:         Vitals:    22 0805   BP: 116/79   Pulse: 66   Temp: 98  F (36.7  C)   TempSrc: Oral   SpO2: 100%   Weight: 86.5 kg (190 lb 9.6 oz)     Body mass index is 34.31 kg/m .     Physical Exam    Vitals reviewed.   Constitutional: Well-developed, well-nourished, in no apparent distress.    HEENT: Normocephalic. no scleral icterus.  Neck/Lymph: Normal ROM  Cardiac:  Regular rate  Respiratory: Normal respiratory excursion   GI:  Abdomen soft, mild obesity  Skin:  Skin is warm and dry. No rash noted.  no jaundice.   Peripheral Vascular: no lower extremity edema. 2+ pulses in all extremities  Musculoskeletal:  ROM intact, normal muscle bulk    Psychiatric: Normal mood and affect. Behavior is normal.  Neuro:  no asterixis, no tremor    Labs and Diagnostic tests:  Lab Results   Component Value Date    BILITOTAL 0.4 2022     2022     2022    ALKPHOS 76 2022     Lab Results   Component Value Date    ALBUMIN 4.5  03/04/2022    PROTTOTAL 8.2 03/04/2022        Procedures:  Liver biopsy: 3/1/2019  Liver, random, needle core biopsy:       -Clinical history of hepatitis B       -Marked steatosis with features of steatohepatitis       -Minimal patchy (grade 0-1) portal activity       -See microscopic description   -The biopsy is of good quality.     There is marked macrovesicular steatosis involving 80% of the hepatic   parenchyma with occasional ballooned hepatocytes and mild lobular   inflammation.  Portal triads show only focal mild inflammation   consisting of lymphocytes.  There is no bile duct damage or   periductular granulomas.  There is no cholestasis.  A reticulin stain   shows an intact reticulin framework.  The trichrome stain does not   show significant portal fibrosis, but does show perivenular fibrosis.     The iron stain does not show significant iron deposition      Assessment and Plan:    Abnormal Liver Tests:    -Based on available information he likely has abnormal liver test secondary to nonalcoholic steatohepatitis  -He has chronic hepatitis B, but the viral load is below the detectable limits, which suggests against hepatitis B as the overt etiology of hepatic inflammation  -He does have a liver biopsy from March 2019 which did demonstrate 80% steatosis with significant steatohepatitis  -We will plan for full lab evaluation including labs to check status of hepatitis B as well as total cholesterol levels and hepatitis delta (given low hepatitis B viral load with elevated liver tests    Non-Alcoholic Fatty Liver Disease  - I had a long discussion with the patient about nonalcoholic fatty liver disease (NAFLD).  We discussed how fat deposits in the liver, how this leads to inflammation, and how chronic inflammation (DESHPANDE) can ultimately lead to scarring and cirrhosis.  The long-term complications of fatty liver disease were discussed with the patient, including the increased risk of cardiovascular disease  "complications,the risk of developing diabetes (if not already), and variable progression to cirrhosis and end stage liver disease.  - He has had a liver biopsy demonstrating overt DESHPANDE    Management of NAFLD/DESHPANDE  - We spent time discussing an appropriate diet, exercise and weight loss plan.      - Recommend exercise regularly: 4+ times per week, with an average of about 45 minutes per day.      - It has shown that patients who exercise regularly can have improvement of insulin resistance and resolution of fatty liver disease, even if they are not able to lose weight.     - Recommend a low-carbohydrate, low-calorie diet (4615-3757 calories per day).    - An ideal weight loss plan would be to lose 7-10% of body weight over the next six months  - Recommend aggressive diabetes management: ideal goal Hgb A1c goal of =/< 7%. If possible addition of insulin sensitizing agents like metformin or liraglutide will be helpful.    - Management of cholesterol is also very important.    - The use of \"statins\" (HMG-CoA reductase medications) are an effective means of therapy and are not contra-indicated in those with abnormal liver tests OR those with cirrhosis.  The value of these medications in this population far outweigh the minor risks of abnormal liver tests.   - Goal LDL in those with DESHPANDE are < 100 mg/dL  - Consider the utility of liberalizing coffee consumption as some data that this may slow progression and reverse effects of DESHPANDE-related fibrosis.  - We plan to order liver tests to be checked every 6 months to assess for dynamic changes, as a potential marker of another cause of chronic liver disease.    E-Ag Negative, Chronic Inactive hepatitis B  -Treating positive viral load but below the detectable limits  -We will plan to check levels again in 6 months  -We will also check hepatitis delta given hepatic inflammation and will hepatitis B viral load  -We will obtain an abdominal ultrasound in 6 months    Routine " Health Care in Patient with Chronic Liver Disease:  - We recommend screening for hepatitis A and B, please vaccinate if not immune  - All patients with liver disease, particularly those with cholestatic liver disease, are at an increased risk for osteoporosis.  We strongly recommend screening for Vitamin D deficiency at least twice yearly with aggressive supplementation/replacement as indicated.    - We also recommend a screening DEXA scan to evaluate for osteoporosis.  If present, should treat with calcium, Vitamin D supplementation, and recommend consideration of bisphosphonate therapy.  Also recommend follow up DEXA scans to evaluate for improvement of bone density on therapy.  - All patients with liver disease should avoid the use of Non-steroidal Anti-Inflammatory (NSAID) medications as they can cause significant injury to the kidneys in this population    Follow Up:  6 months      Thank you very much for the opportunity to participate in the care of this patient.  If you have any further questions, please don't hesitate to contact our office.    Thomas M. Leventhal, M.D.   of Medicine  Advanced & Transplant Hepatology  [facility]

## 2022-08-30 ENCOUNTER — TELEPHONE (OUTPATIENT)
Dept: FAMILY MEDICINE | Facility: CLINIC | Age: 32
End: 2022-08-30

## 2022-08-30 NOTE — TELEPHONE ENCOUNTER
Order/Referral Request    Who is requesting: Pt    Orders being requested: TB    Reason service is needed/diagnosis: For work    When are orders needed by: ASAP    Has this been discussed with Provider: No    Does patient have a preference on a Group/Provider/Facility? [facility]    Does patient have an appointment scheduled?: No, No appt available, Pt is wondering if PCP can fit him in this week or place an order to get Quantiferon done. Will need to call Pt back to schedule or relay message from PCP.    Where to send orders: N/A    Okay to leave a detailed message?: Yes at Home number on file [phone] (home)

## 2022-08-30 NOTE — TELEPHONE ENCOUNTER
Dr. Henry,  Are you able to place the order for TB Quantiferon and squeeze patient for a quick in-person visit?  Last seen and tested was 1/18/2018.    Please advise.    Thank you    Flaco

## 2022-08-31 ENCOUNTER — OFFICE VISIT (OUTPATIENT)
Dept: FAMILY MEDICINE | Facility: CLINIC | Age: 32
End: 2022-08-31
Payer: COMMERCIAL

## 2022-08-31 VITALS
SYSTOLIC BLOOD PRESSURE: 120 MMHG | WEIGHT: 193.5 LBS | BODY MASS INDEX: 34.29 KG/M2 | OXYGEN SATURATION: 97 % | HEIGHT: 63 IN | TEMPERATURE: 98.6 F | DIASTOLIC BLOOD PRESSURE: 76 MMHG | HEART RATE: 64 BPM

## 2022-08-31 DIAGNOSIS — Z11.1 SCREENING EXAMINATION FOR PULMONARY TUBERCULOSIS: Primary | ICD-10-CM

## 2022-08-31 PROCEDURE — 86481 TB AG RESPONSE T-CELL SUSP: CPT | Performed by: FAMILY MEDICINE

## 2022-08-31 PROCEDURE — 36415 COLL VENOUS BLD VENIPUNCTURE: CPT | Performed by: FAMILY MEDICINE

## 2022-08-31 PROCEDURE — 99213 OFFICE O/P EST LOW 20 MIN: CPT | Performed by: FAMILY MEDICINE

## 2022-08-31 NOTE — LETTER
September 16, 2022      [facility]  [address]  [place] [address]        Hi Lweh,    Your Quantiferon test was still positive, which is usual after treatment for latent TB.  You do not need any further treatment.  Your chest x-ray was normal.  The form was faxed to [facility].    Resulted Orders   Quantiferon TB Gold Plus Grey Tube   Result Value Ref Range    Quantiferon Nil Tube 3.63 IU/mL   Quantiferon TB Gold Plus Green Tube   Result Value Ref Range    Quantiferon TB1 Tube 10.00 IU/mL   Quantiferon TB Gold Plus Yellow Tube   Result Value Ref Range    Quantiferon TB2 Tube 10.00    Quantiferon TB Gold Plus Purple Tube   Result Value Ref Range    Quantiferon Mitogen 10.00 IU/mL   Quantiferon TB Gold Plus   Result Value Ref Range    Quantiferon-TB Gold Plus Positive (A) Negative      Comment:      Interferon gamma response to M.tuberculosis antigens was detected,suggesting infection with M.tuberculosis. Positive results in patients at low risk for infection should be interpreted with caution and repeat testing on a new sample should be considered as recommended by the 2017 ATS/IDSA/CDC Clinical Prac  jose Guidelines for Diagnosis of Tuberculosis in Adults and Children     TB1 Ag minus Nil Value 6.37 IU/mL    TB2 Ag minus Nil Value 6.37 IU/mL    Mitogen minus Nil Result 6.37 IU/mL    Nil Result 3.63 IU/mL       If you have any questions or concerns, please call the clinic at the number listed above.       Sincerely,      Atilio Henry MD

## 2022-08-31 NOTE — PROGRESS NOTES
"  Assessment & Plan     Screening examination for pulmonary tuberculosis    - Quantiferon TB Gold Plus    Fax to [facility].             BMI:   Estimated body mass index is 34.61 kg/m  as calculated from the following:    Height as of this encounter: 1.593 m (5' 2.7\").    Weight as of this encounter: 87.8 kg (193 lb 8 oz).           Return in about 1 year (around 8/31/2023) for Routine preventive.    Atilio Henry MD, MD  [facility] BRITANY Price is a 31 year old, presenting for the following health issues:  TB testing (Patient will  report.)      HPI     He was treated for latent TB in 2015-16.  No cough.  No exposure to TB.    No current outpatient medications on file.         Review of Systems   No fever, chills, sweats, weight loss.      Objective    /76 (Cuff Size: Adult Regular)   Pulse 64   Temp 98.6  F (37  C)   Ht 1.593 m (5' 2.7\")   Wt 87.8 kg (193 lb 8 oz)   SpO2 97%   BMI 34.61 kg/m    Body mass index is 34.61 kg/m .  Physical Exam   Heart normal  Lungs normal                .  ..  "

## 2022-09-02 LAB
GAMMA INTERFERON BACKGROUND BLD IA-ACNC: 3.63 IU/ML
M TB IFN-G BLD-IMP: POSITIVE
M TB IFN-G CD4+ BCKGRND COR BLD-ACNC: 6.37 IU/ML
MITOGEN IGNF BCKGRD COR BLD-ACNC: 6.37 IU/ML
MITOGEN IGNF BCKGRD COR BLD-ACNC: 6.37 IU/ML
QUANTIFERON MITOGEN: 10 IU/ML
QUANTIFERON NIL TUBE: 3.63 IU/ML
QUANTIFERON TB1 TUBE: 10 IU/ML
QUANTIFERON TB2 TUBE: 10

## 2022-09-15 ENCOUNTER — TELEPHONE (OUTPATIENT)
Dept: FAMILY MEDICINE | Facility: CLINIC | Age: 32
End: 2022-09-15

## 2022-09-15 DIAGNOSIS — R76.12 REACTION TO QUANTIFERON-TB TEST (QFT) WITHOUT ACTIVE TUBERCULOSIS: Primary | ICD-10-CM

## 2022-09-15 NOTE — TELEPHONE ENCOUNTER
Order/Referral Request    Who is requesting: Pt     Orders being requested: CXR for positive quantiferon. Pt non symptomatic and was treated for laten TB 0820-1515.  8/3122 quantiferon test for work    When are orders needed by: ASAP    Has this been discussed with Provider: Yes    Does patient have a preference on a Group/Provider/Facility? [facility]    Does patient have an appointment scheduled?: Yes: 09/16/22 at 1015 am     Where to send orders: in EPIC    Okay to leave a detailed message?: No     Call taken on 9/16/22 at 1 pm by ALANNAH Helton

## 2022-09-16 ENCOUNTER — APPOINTMENT (OUTPATIENT)
Dept: LAB | Facility: CLINIC | Age: 32
End: 2022-09-16
Payer: COMMERCIAL

## 2022-09-16 DIAGNOSIS — R76.12 REACTION TO QUANTIFERON-TB TEST (QFT) WITHOUT ACTIVE TUBERCULOSIS: Primary | ICD-10-CM

## 2023-04-20 ENCOUNTER — PATIENT OUTREACH (OUTPATIENT)
Dept: CARE COORDINATION | Facility: CLINIC | Age: 33
End: 2023-04-20
Payer: COMMERCIAL

## 2023-06-26 ENCOUNTER — HOSPITAL ENCOUNTER (EMERGENCY)
Facility: HOSPITAL | Age: 33
Discharge: HOME | End: 2023-06-26
Attending: EMERGENCY MEDICINE | Admitting: EMERGENCY MEDICINE
Payer: COMMERCIAL

## 2023-06-26 VITALS
OXYGEN SATURATION: 96 % | RESPIRATION RATE: 20 BRPM | HEART RATE: 79 BPM | SYSTOLIC BLOOD PRESSURE: 126 MMHG | WEIGHT: 190 LBS | DIASTOLIC BLOOD PRESSURE: 82 MMHG | BODY MASS INDEX: 32.44 KG/M2 | HEIGHT: 64 IN | TEMPERATURE: 97.9 F

## 2023-06-26 DIAGNOSIS — R04.0 EPISTAXIS: ICD-10-CM

## 2023-06-26 LAB
APTT PPP: 30 SECONDS (ref 22–38)
BASOPHILS # BLD AUTO: 0 10E3/UL (ref 0–0.2)
BASOPHILS NFR BLD AUTO: 0 %
EOSINOPHIL # BLD AUTO: 0.2 10E3/UL (ref 0–0.7)
EOSINOPHIL NFR BLD AUTO: 2 %
ERYTHROCYTE [DISTWIDTH] IN BLOOD BY AUTOMATED COUNT: 12.2 % (ref 10–15)
HCT VFR BLD AUTO: 41.1 % (ref 40–53)
HGB BLD-MCNC: 14.1 G/DL (ref 13.3–17.7)
IMM GRANULOCYTES # BLD: 0 10E3/UL
IMM GRANULOCYTES NFR BLD: 0 %
INR PPP: 1.01 (ref 0.85–1.15)
LYMPHOCYTES # BLD AUTO: 3.5 10E3/UL (ref 0.8–5.3)
LYMPHOCYTES NFR BLD AUTO: 39 %
MCH RBC QN AUTO: 28.7 PG (ref 26.5–33)
MCHC RBC AUTO-ENTMCNC: 34.3 G/DL (ref 31.5–36.5)
MCV RBC AUTO: 84 FL (ref 78–100)
MONOCYTES # BLD AUTO: 0.6 10E3/UL (ref 0–1.3)
MONOCYTES NFR BLD AUTO: 6 %
NEUTROPHILS # BLD AUTO: 4.6 10E3/UL (ref 1.6–8.3)
NEUTROPHILS NFR BLD AUTO: 53 %
NRBC # BLD AUTO: 0 10E3/UL
NRBC BLD AUTO-RTO: 0 /100
PLATELET # BLD AUTO: 416 10E3/UL (ref 150–450)
RBC # BLD AUTO: 4.92 10E6/UL (ref 4.4–5.9)
WBC # BLD AUTO: 9 10E3/UL (ref 4–11)

## 2023-06-26 PROCEDURE — 85730 THROMBOPLASTIN TIME PARTIAL: CPT | Performed by: EMERGENCY MEDICINE

## 2023-06-26 PROCEDURE — 85025 COMPLETE CBC W/AUTO DIFF WBC: CPT | Performed by: EMERGENCY MEDICINE

## 2023-06-26 PROCEDURE — 99283 EMERGENCY DEPT VISIT LOW MDM: CPT

## 2023-06-26 PROCEDURE — 85610 PROTHROMBIN TIME: CPT | Performed by: EMERGENCY MEDICINE

## 2023-06-26 PROCEDURE — 36415 COLL VENOUS BLD VENIPUNCTURE: CPT | Performed by: EMERGENCY MEDICINE

## 2023-06-26 NOTE — DISCHARGE INSTRUCTIONS
As discussed in the ER recommend keeping clip if any new bleeding or or even smaller oozing placed clip on for at least 45 minutes.  Recommend not blowing your nose, picking, or trying to clear your nose for at least 24 hours or longer to prevent dislodging the clot.

## 2023-06-26 NOTE — ED TRIAGE NOTES
Pt arrives for evaluation of a nosebleed that started around 2330 tonight. Pt states this is his 3rd nosebleed today. He had EMS come out to his house around 0000 and they gave him a nose clamp and then Pt decided he would have family bring him into the ER. Pt is not on thinners. Reports feeling dizzy and having cramps in his legs.      Triage Assessment     Row Name 06/26/23 0113       Triage Assessment (Adult)    Airway WDL WDL       Respiratory WDL    Respiratory WDL WDL       Skin Circulation/Temperature WDL    Skin Circulation/Temperature WDL WDL       Cardiac WDL    Cardiac WDL WDL       Peripheral/Neurovascular WDL    Peripheral Neurovascular WDL WDL       Cognitive/Neuro/Behavioral WDL    Cognitive/Neuro/Behavioral WDL WDL

## 2023-06-26 NOTE — ED NOTES
Discharge    Discharge paperwork discussed. Additional noses clip provided to patient. Patient questions answered. AVS in hand. Patient discharged from ED.

## 2023-06-26 NOTE — ED PROVIDER NOTES
NAME: Albert Adams  AGE: 32 year old male  YOB: 1990  MRN: [id number]  EVALUATION DATE & TIME: 2023  1:19 AM    PCP: Atilio Henry    ED PROVIDER: Arsh Landa M.D.      Chief Complaint   Patient presents with     Epistaxis     FINAL IMPRESSION:  1. Epistaxis        MEDICAL DECISION MAKIN:38 AM I met with the patient, obtained history, performed an initial exam, and discussed options and plan for diagnostics and treatment here in the ED.   Patient was clinically assessed and consented to treatment. After assessment, medical decision making and workup were discussed with the patient. The patient was agreeable to plan for testing, workup, and treatment.  Pertinent Labs & Imaging studies reviewed. (See chart for details)  2:21 AM I checked on the patient. He was sleeping upon entering the room. I discussed discharged and he was comfortable with the plan. I instructed the patient to refrain from blowing his nose.       Medical Decision Making    History:    Supplemental history from: Documented in chart, if applicable and Family Member/Significant Other    External Record(s) reviewed: Documented in chart, if applicable.    Work Up:    Chart documentation includes differential considered and any EKGs or imaging independently interpreted by provider, where specified.    In additional to work up documented, I considered the following work up: Documented in chart, if applicable.    External consultation:    Discussion of management with another provider: Documented in chart, if applicable    Complicating factors:    Care impacted by chronic illness: N/A    Care affected by social determinants of health: N/A    Disposition considerations: Discharge. No recommendations on prescription strength medication(s). See documentation for any additional details.        Albert Adams is a 32 year old male who presents with nosebleed.   Differential diagnosis includes but not limited to anterior  epistaxis, posterior epistaxis, anemia, coagulopathy.  Patient with a nosebleed that happened 3 times today.  Patient concerned about recurrent bleeding.  Presently patient does have a clot in his left nares that appears to be wet but holding and no active oozing.  There is no blood in the back of his throat we did pull a clot from back of his throat earlier.  Patient appears otherwise well but unclear etiology of the nosebleed and uncertain if he had any significant work-up in the past though he does not report significant amount of nosebleeds in the past as well.  Labs were checked and showed stable hemoglobin, unremarkable coagulopathy studies.  Patient reassured following these and after nasal clip was placed and held for 40 minutes I did remove it and that what clot is now hardened and appears much more dry and mature.  I did caution patient on blowing his nose, picking, or trying to remove the clot for at least 24 hours but recommended more like 48.  I recommended he allow this to fully mature and then he may try to clear his nose cellulitis agent to prevent rebleeding.    0 minutes of critical care time    MEDICATIONS GIVEN IN THE EMERGENCY:  Medications - No data to display    NEW PRESCRIPTIONS STARTED AT TODAY'S ER VISIT:  There are no discharge medications for this patient.         =================================================================    HPI    Patient information was obtained from: The patient    Use of : Yes (Family member) - Language (Oneyda)        Jreh ASAF Adams is a 32 year old male with a past medical history of chronic hepatitis B, who presents to the ED via walk-in for an evaluation of epistaxis.    The patient had an epistaxis that started around 11:30 PM last night. He denied any trauma to his nose or nose blowing. He notes this is his 3rd episode of epistaxis last night. The patient reports it was bleeding to his left nostril and started bleeding from the other nostril when he  "plugged it with a tissue. He denied any other medical problems. He is neither allergic nor taking any medication.     Otherwise, the patient denied any other medical complaints or concerns at this time.      REVIEW OF SYSTEMS   Review of Systems   HENT: Positive for nosebleeds (left nostril).    All other systems reviewed and are negative.       PAST MEDICAL HISTORY:  Past Medical History:   Diagnosis Date     Chronic hepatitis B (H)      LTBI (latent tuberculosis infection) 2016    treated by [facility], ending in 2016       PAST SURGICAL HISTORY:  History reviewed. No pertinent surgical history.    CURRENT MEDICATIONS:    No current facility-administered medications for this encounter.  No current outpatient medications on file.    ALLERGIES:  No Known Allergies    FAMILY HISTORY:  No family history on file.    SOCIAL HISTORY:   Social History     Socioeconomic History     Marital status:    Tobacco Use     Smoking status: Former     Types: Cigarettes     Quit date: 2013     Years since quittin.6     Smokeless tobacco: Never     Tobacco comments:     no passive exposure   Substance and Sexual Activity     Alcohol use: No     Drug use: No     Sexual activity: Yes     Partners: Female       PHYSICAL EXAM:    Vitals: /82   Pulse 79   Temp 97.9  F (36.6  C) (Oral)   Resp 20   Ht 1.626 m (5' 4\")   Wt 86.2 kg (190 lb)   SpO2 96%   BMI 32.61 kg/m     Physical Exam  Vitals and nursing note reviewed.   Constitutional:       General: He is not in acute distress.     Appearance: Normal appearance. He is normal weight. He is not ill-appearing or toxic-appearing.   HENT:      Head: Normocephalic and atraumatic.      Nose:      Comments: Wet clot in left naris, no active bleeding, appears anterior but no obvious source of bleeding can be seen underneath the clot.     Mouth/Throat:      Mouth: Mucous membranes are moist.      Pharynx: Oropharynx is clear. No oropharyngeal exudate or posterior " oropharyngeal erythema.      Comments: No posterior bleeding down throat.  Cardiovascular:      Rate and Rhythm: Normal rate and regular rhythm.      Pulses: Normal pulses.   Pulmonary:      Effort: Pulmonary effort is normal. No respiratory distress.      Breath sounds: Normal breath sounds.   Skin:     General: Skin is warm and dry.      Coloration: Skin is not pale.   Neurological:      General: No focal deficit present.      Mental Status: He is alert.   Psychiatric:         Behavior: Behavior normal.           LAB:  All pertinent labs reviewed and interpreted.  Labs Ordered and Resulted from Time of ED Arrival to Time of ED Departure   INR - Normal       Result Value    INR 1.01     PARTIAL THROMBOPLASTIN TIME - Normal    aPTT 30     CBC WITH PLATELETS AND DIFFERENTIAL    WBC Count 9.0      RBC Count 4.92      Hemoglobin 14.1      Hematocrit 41.1      MCV 84      MCH 28.7      MCHC 34.3      RDW 12.2      Platelet Count 416      % Neutrophils 53      % Lymphocytes 39      % Monocytes 6      % Eosinophils 2      % Basophils 0      % Immature Granulocytes 0      NRBCs per 100 WBC 0      Absolute Neutrophils 4.6      Absolute Lymphocytes 3.5      Absolute Monocytes 0.6      Absolute Eosinophils 0.2      Absolute Basophils 0.0      Absolute Immature Granulocytes 0.0      Absolute NRBCs 0.0         RADIOLOGY:  No orders to display         IAmos, am serving as a scribe to document services personally performed by Dr. Arsh Landa  based on my observation and the provider's statements to me. IArsh MD attest that Amos Causey is acting in a scribe capacity, has observed my performance of the services and has documented them in accordance with my direction.      Arsh Landa M.D.  Emergency Medicine  [facility] Emergency Department       Arsh Landa MD  06/26/23 3801

## 2024-07-22 ENCOUNTER — OFFICE VISIT (OUTPATIENT)
Dept: FAMILY MEDICINE | Facility: CLINIC | Age: 34
End: 2024-07-22
Payer: COMMERCIAL

## 2024-07-22 VITALS
DIASTOLIC BLOOD PRESSURE: 82 MMHG | SYSTOLIC BLOOD PRESSURE: 120 MMHG | RESPIRATION RATE: 16 BRPM | WEIGHT: 183.25 LBS | OXYGEN SATURATION: 98 % | HEIGHT: 64 IN | HEART RATE: 75 BPM | BODY MASS INDEX: 31.28 KG/M2 | TEMPERATURE: 97.8 F

## 2024-07-22 DIAGNOSIS — B18.1 CHRONIC VIRAL HEPATITIS B WITHOUT DELTA AGENT AND WITHOUT COMA (H): ICD-10-CM

## 2024-07-22 DIAGNOSIS — Z11.1 SCREENING EXAMINATION FOR PULMONARY TUBERCULOSIS: Primary | ICD-10-CM

## 2024-07-22 LAB
ALBUMIN SERPL BCG-MCNC: 4.7 G/DL (ref 3.5–5.2)
ALP SERPL-CCNC: 73 U/L (ref 40–150)
ALT SERPL W P-5'-P-CCNC: 67 U/L (ref 0–70)
ANION GAP SERPL CALCULATED.3IONS-SCNC: 12 MMOL/L (ref 7–15)
AST SERPL W P-5'-P-CCNC: 37 U/L (ref 0–45)
BILIRUB SERPL-MCNC: 0.3 MG/DL
BUN SERPL-MCNC: 11.3 MG/DL (ref 6–20)
CALCIUM SERPL-MCNC: 9.3 MG/DL (ref 8.8–10.4)
CHLORIDE SERPL-SCNC: 102 MMOL/L (ref 98–107)
CREAT SERPL-MCNC: 0.68 MG/DL (ref 0.67–1.17)
EGFRCR SERPLBLD CKD-EPI 2021: >90 ML/MIN/1.73M2
GLUCOSE SERPL-MCNC: 247 MG/DL (ref 70–99)
HCO3 SERPL-SCNC: 25 MMOL/L (ref 22–29)
POTASSIUM SERPL-SCNC: 3.8 MMOL/L (ref 3.4–5.3)
PROT SERPL-MCNC: 7.9 G/DL (ref 6.4–8.3)
SODIUM SERPL-SCNC: 139 MMOL/L (ref 135–145)

## 2024-07-22 PROCEDURE — 86481 TB AG RESPONSE T-CELL SUSP: CPT | Performed by: FAMILY MEDICINE

## 2024-07-22 PROCEDURE — 87517 HEPATITIS B DNA QUANT: CPT | Performed by: FAMILY MEDICINE

## 2024-07-22 PROCEDURE — 36415 COLL VENOUS BLD VENIPUNCTURE: CPT | Performed by: FAMILY MEDICINE

## 2024-07-22 PROCEDURE — 80053 COMPREHEN METABOLIC PANEL: CPT | Performed by: FAMILY MEDICINE

## 2024-07-22 PROCEDURE — 99214 OFFICE O/P EST MOD 30 MIN: CPT | Performed by: FAMILY MEDICINE

## 2024-07-22 NOTE — PROGRESS NOTES
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    He has been treated for LTBI in 2015.    No symptoms.    No need for repeat treatment.      - Quantiferon TB Gold Plus  - Quantiferon TB Gold Plus    Chronic viral hepatitis B without delta agent and without coma (H)    Stable.    - Hep B Virus DNA Quant Real Time PCR  - Comprehensive metabolic panel (BMP + Alb, Alk Phos, ALT, AST, Total. Bili, TP)  - Hep B Virus DNA Quant Real Time PCR  - Comprehensive metabolic panel (BMP + Alb, Alk Phos, ALT, AST, Total. Bili, TP)      Ordering of each unique test          BMI  Estimated body mass index is 31.45 kg/m  as calculated from the following:    Height as of this encounter: 1.626 m (5' 4\").    Weight as of this encounter: 83.1 kg (183 lb 4 oz).             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    He was treated for latent TB in 2015.    Had normal CXR in 2022.    No cough, fever, chills, sweats, weight loss, exposure to TB.    Has hep B.    No current outpatient medications on file.             Objective    /82   Pulse 75   Temp 97.8  F (36.6  C) (Oral)   Resp 16   Ht 1.626 m (5' 4\")   Wt 83.1 kg (183 lb 4 oz)   SpO2 98%   BMI 31.45 kg/m    Body mass index is 31.45 kg/m .  Physical Exam     Heart normal  Lungs normal  Abdomen normal          Signed Electronically by: Atilio Henry MD    "

## 2024-07-24 LAB
GAMMA INTERFERON BACKGROUND BLD IA-ACNC: 0.44 IU/ML
HBV DNA SERPL NAA+PROBE-ACNC: 20 IU/ML
HBV DNA SERPL NAA+PROBE-LOG IU: 1.3 {LOG_IU}/ML
M TB IFN-G BLD-IMP: POSITIVE
M TB IFN-G CD4+ BCKGRND COR BLD-ACNC: 9.56 IU/ML
MITOGEN IGNF BCKGRD COR BLD-ACNC: 9.56 IU/ML
MITOGEN IGNF BCKGRD COR BLD-ACNC: 9.56 IU/ML
QUANTIFERON MITOGEN: 10 IU/ML
QUANTIFERON NIL TUBE: 0.44 IU/ML
QUANTIFERON TB1 TUBE: 10 IU/ML
QUANTIFERON TB2 TUBE: 10

## 2024-07-26 ENCOUNTER — TELEPHONE (OUTPATIENT)
Dept: FAMILY MEDICINE | Facility: CLINIC | Age: 34
End: 2024-07-26
Payer: COMMERCIAL

## 2024-07-26 NOTE — TELEPHONE ENCOUNTER
Please call pt:  glucose was high at 247.  Please arrange appt with me in August, we will check A1c then.  Quantiferon was positive, as expected, and does not need treatment again.  Hep B very low at 20.  Thanks - emilyh

## 2024-07-29 NOTE — TELEPHONE ENCOUNTER
The patient verbalizes understanding of provider/CSS instructions for follow-up and continued care per provider message.     AUG follow up scheduled per MD request.